# Patient Record
Sex: MALE | Race: WHITE
[De-identification: names, ages, dates, MRNs, and addresses within clinical notes are randomized per-mention and may not be internally consistent; named-entity substitution may affect disease eponyms.]

---

## 2019-07-31 ENCOUNTER — HOSPITAL ENCOUNTER (EMERGENCY)
Dept: HOSPITAL 91 - E/R | Age: 39
Discharge: HOME | End: 2019-07-31
Payer: SELF-PAY

## 2019-07-31 ENCOUNTER — HOSPITAL ENCOUNTER (EMERGENCY)
Dept: HOSPITAL 10 - E/R | Age: 39
Discharge: HOME | End: 2019-07-31
Payer: SELF-PAY

## 2019-07-31 VITALS
WEIGHT: 191.36 LBS | WEIGHT: 191.36 LBS | HEIGHT: 65 IN | BODY MASS INDEX: 31.88 KG/M2 | HEIGHT: 65 IN | BODY MASS INDEX: 31.88 KG/M2

## 2019-07-31 VITALS — SYSTOLIC BLOOD PRESSURE: 124 MMHG | DIASTOLIC BLOOD PRESSURE: 84 MMHG | HEART RATE: 70 BPM | RESPIRATION RATE: 18 BRPM

## 2019-07-31 DIAGNOSIS — R05: Primary | ICD-10-CM

## 2019-07-31 PROCEDURE — 99283 EMERGENCY DEPT VISIT LOW MDM: CPT

## 2019-07-31 PROCEDURE — 71045 X-RAY EXAM CHEST 1 VIEW: CPT

## 2019-07-31 NOTE — ERD
ER Documentation


Chief Complaint


Chief Complaint





cough x 1 month. also c/o runny nose. no wheezing





HPI


38-year-old male with no significant past medical history presents for cough x1 


month.  Cough noted to be dry.  Patient is also been having mild runny nose.  He


did have symptoms to go has mostly resolved except for the cough.  Denies any 


fevers or chills.  Denies chest pain or shortness of breath.  Denies abdominal 


pain, nausea, vomiting.  No other modifying factors noted, no treatment tried at


home.





ROS


All systems reviewed and are negative except as per history of present illness.





Medications


Home Meds


Active Scripts


D-Methorphan Hb/P-Epd HCl/Bpm (Vogjmsiyml-Ushqycwgwtp-Ty Syr) 118 Ml Syrup, 5 ML


PO Q4H PRN for COUGH, #1 BOTTLE


   Prov:GRANGERCAMI          7/31/19


Albuterol Sulfate* (Ventolin HFA*) 18 Gm Hfa.aer.ad, 2 PUFF INHALATION Q4H PRN 


for cough/shortness of breath, #1 INHALER


   Prov:CAMI GRANGER DO         7/31/19





Allergies


Allergies:  


Coded Allergies:  


     No Known Drug Allergies (Verified  Allergy, Unknown, 7/31/19)





PMhx/Soc


Medical and Surgical Hx:  pt denies Medical Hx, pt denies Surgical Hx


Hx Alcohol Use:  No


Hx Substance Use:  No


Hx Tobacco Use:  No





FmHx


Family History:  No coronary disease





Physical Exam


Vitals


Vital Signs


  Date      Temp  Pulse  Resp  B/P (MAP)   Pulse Ox  O2          O2 Flow    FiO2


Time                                                 Delivery    Rate


   7/31/19  98.9     70    18      124/84        99


     18:30                           (97)





Physical Exam


Const:   No acute distress


Head:   Atraumatic 


Eyes:    Normal Conjunctiva


ENT:    Normal External Ears, bilateral tympanic membrane intact without 


erythema or bulging noted, nose and Mouth examination normal, no tonsillar 


swelling or exudate noted


Neck:               Full range of motion. No meningismus.


Resp:   Clear to auscultation bilaterally, no wheezing, rales, rhonchi


Cardio:   Regular rate and rhythm, no murmurs


Skin:   No petechiae or rashes


Ext:    No cyanosis, or edema


Neur:   Awake and alert


Psych:    Normal Mood and Affect





Procedures/MDM


Medical Decision Making:





Differential diagnosis includes but not limited to upper respiratory infection, 


pneumonia, sepsis, meningitis, influenza, postviral cough.





Patient appeared well on physical examination, nontoxic appearing.  Lungs were 


clear to auscultation bilaterally.  There is low suspicion for pneumonia, 


sepsis, meningitis.





Given prior flu like symptoms with persistence of cough, patient may have a 


postviral cough.





Patient given prescription for supportive medication(s).





Patient advised to follow up with PCP in 1-2 days. Patient advised to return to 


ED for new or worsening symptoms. Patient stable on discharge from the ED.











Disclaimer: Inadvertent spelling and grammatical errors are likely due to 


EHR/dictation software use and do not reflect on the overall quality of patient 


care. Also, please note that the electronic time recorded on this note does not 


necessarily reflect the actual time of the patient encounter.





Departure


Diagnosis:  


   Primary Impression:  


   Cough


Condition:  Fair


Patient Instructions:  Cough, Chronic, Uncertain Cause, (Adult)


Referrals:  


[facility]


YOU HAVE RECEIVED A MEDICAL SCREENING EXAM AND THE RESULTS INDICATE THAT YOU DO 


NOT HAVE A CONDITION THAT REQUIRES URGENT TREATMENT IN THE EMERGENCY DEPARTMENT.





FURTHER EVALUATION AND TREATMENT OF YOUR CONDITION CAN WAIT UNTIL YOU ARE SEEN 


IN YOUR DOCTORS OFFICE WITHIN THE NEXT 1-2 DAYS. IT IS YOUR RESPONSIBILITY TO 


MAKE AN APPOINTMENT FOR FOLOW-UP CARE.





IF YOU HAVE A PRIMARY DOCTOR


--you should call your primary doctor and schedule an appointment





IF YOU DO NOT HAVE A PRIMARY DOCTOR YOU CAN CALL OUR PHYSICIAN REFERRAL HOTLINE 


AT


 [phone] 





IF YOU CAN NOT AFFORD TO SEE A PHYSICIAN YOU CAN CHOSE FROM THE FOLLOWING 


[facility] [phone] [address] [facility]. [facility] [phone] [address] [facility]. [facility] [phone] [address] [facility]. [facility] [phone] [address] [facility] [phone] [address] [facility]. [facility]. [phone]


[address]





Additional Instructions:  


Call your primary care doctor TOMORROW for an appointment during the next 1-2 


days.See the doctor sooner or return here if your condition worsens before your 


appointment time.





Llame al doctor GILBERT y dave nikko DESIREE PARA DENTRO DE 1-2 BEAULIEU.Dgale a la 


secretaria que nosotros le instruimos hacer esta desiree.Avise o llame si chester 


condicin se empeora antes de la desiree. Regresa aqui si peor o no mejor.











CAMI GRANGER DO                 Jul 31, 2019 20:57